# Patient Record
Sex: FEMALE | Race: WHITE | RURAL
[De-identification: names, ages, dates, MRNs, and addresses within clinical notes are randomized per-mention and may not be internally consistent; named-entity substitution may affect disease eponyms.]

---

## 2020-02-24 ENCOUNTER — CONVERSION ENCOUNTER (OUTPATIENT)
Dept: CARDIOLOGY | Facility: CLINIC | Age: 25
End: 2020-02-24
Attending: SPECIALIST

## 2024-12-09 NOTE — PROGRESS NOTES
Chief Complaint/Reason for Referral:  Establish Care (anemia)    Nena Denny, A*  Nena Denny, APRN    Records Obtained:  Records of the patients history including those obtained from [facility] EMR were reviewed and summarized in detail.    Subjective    History of Present Illness    Bro Shepherd 29 y.o. presents to [facility] HEMATOLOGY & ONCOLOGY for Normocytic Anemia    Very pleasant 29-year-old female presents to the hematology department for further evaluation of her normocytic anemia.    Clinical history reveals hepatomegaly and splenomegaly.  I have presented to her PCP with complaints of fever fatigue.  Tmax of 103.6.  Patient was checked for COVID flu which were negative.  Patient states that she was wondering if her spleen could be enlarged since she has a history of having mono in college.    Most recent CBC from 2024 reveals a white blood cell count of 3.9, hemoglobin of 11.7, hematocrit of 35.7, MCV of 85, platelet count 266.  Peripheral blood smear reveals a normocytic normochromic anemia no increase in schistocytes.  Mild leukopenia with otherwise normal granulocyte morphology.  No circulating blasts identified.  Adequate platelets.  Patient is no longer having fevers that lasted 2-3 weeks.    Lyme's titer is negative, [place] spotted fever is negative, EBV titers reveal IgG elevation negative IgM indicative of past history of EBV, CBC from 2024 reveals a normal white blood cell count, normal hemoglobin, normal hematocrit, normal platelet count.  CMP reveals a normal glucose, normal renal function, normal calcium and protein level, normal liver function testing.  Normal thyroid testing, normal vitamin D, normal vitamin B12.    Past surgical history for having tonsillectomy in  and an appendectomy in .     - 9,8,5,1 yo - no complications, vaginal deliveries     Cancer-related family history is not on file.     Oncology/Hematology History  "   No history exists.     Review of Systems   Constitutional:  Positive for fatigue. Negative for diaphoresis, unexpected weight gain and unexpected weight loss.   Gastrointestinal:  Positive for abdominal pain (LUQ). Negative for constipation, diarrhea, nausea and vomiting.   Neurological:  Positive for weakness and headache (some).        Shaky    All other systems reviewed and are negative.    No current outpatient medications on file prior to visit.     No current facility-administered medications on file prior to visit.     No Known Allergies  Past Medical History:   Diagnosis Date    Anemia      History reviewed. No pertinent surgical history.  Social History     Socioeconomic History    Marital status:    Tobacco Use    Smoking status: Never    Smokeless tobacco: Never   Vaping Use    Vaping status: Never Used   Substance and Sexual Activity    Alcohol use: Not Currently    Drug use: Never    Sexual activity: Defer     History reviewed. No pertinent family history.  Immunization History   Administered Date(s) Administered    Fluzone (or Fluarix & Flulaval for VFC) >6mos 10/26/2018, 10/22/2019    Fluzone High-Dose 65+YRS 10/14/2021     Tobacco Use: Low Risk  (12/10/2024)    Patient History     Smoking Tobacco Use: Never     Smokeless Tobacco Use: Never     Passive Exposure: Not on file     Objective     Vitals:    12/10/24 1037   BP: 121/76   Pulse: 92   Resp: 16   Temp: 98.7 °F (37.1 °C)   TempSrc: Temporal   SpO2: 99%   Weight: 84.3 kg (185 lb 12.8 oz)   Height: 172.7 cm (68\")   PainSc: 0-No pain      Physical Exam  Vitals and nursing note reviewed.   Constitutional:       General: She is not in acute distress.     Appearance: Normal appearance. She is not ill-appearing.   HENT:      Head: Normocephalic.   Eyes:      Conjunctiva/sclera: Conjunctivae normal.   Cardiovascular:      Rate and Rhythm: Normal rate and regular rhythm.      Heart sounds: Normal heart sounds.   Pulmonary:      Effort: " Pulmonary effort is normal.      Breath sounds: Normal breath sounds.   Abdominal:      Palpations: Abdomen is soft.   Lymphadenopathy:      Cervical: No cervical adenopathy.      Right cervical: No superficial cervical adenopathy.     Left cervical: No superficial cervical adenopathy.      Upper Body:      Right upper body: No axillary adenopathy.      Left upper body: No axillary adenopathy.   Skin:     Coloration: Skin is not jaundiced.   Neurological:      Mental Status: She is alert.   Psychiatric:         Mood and Affect: Mood normal.         Behavior: Behavior normal. Behavior is cooperative.         Thought Content: Thought content normal.         Judgment: Judgment normal.       Wt Readings from Last 3 Encounters:   12/10/24 84.3 kg (185 lb 12.8 oz)      Body mass index is 28.25 kg/m².    ECOG score: 0         ECOG: (0) Fully Active - Able to Carry On All Pre-disease Performance Without Restriction  Fall Risk Assessment was completed, and patient is at low risk for falls.  PHQ-9 Total Score:       The patient is  experiencing fatigue. Fatigue score: 9    PT/OT Functional Screening:   Speech Functional Screening:   Rehab to be ordered:     Vitals:    12/10/24 1037   PainSc: 0-No pain         Bro Joao reports a pain score of 0.      PHQ-2 Depression Screening  Little interest or pleasure in doing things? Not at all   Feeling down, depressed, or hopeless? Not at all   PHQ-2 Total Score 0     Result Review :  The following data was reviewed by: Loy Root PA-C on 12/10/2024:    LABS SCANNED (11/20/2024)  LABS SCANNED (11/08/2024)  LABS SCANNED (11/05/2024)    No Images in the past 120 days found..     Assessment and Plan:  Diagnoses and all orders for this visit:    1. Normocytic anemia (Primary)  -     H. Pylori Antigen, Stool - Stool, Per Rectum  -     Vitamin B12 & Folate  -     Comprehensive Metabolic Panel  -     Urinalysis With Microscopic - Urine, Clean Catch  -     Occult Blood, Fecal By  Immunoassay - Stool, Per Rectum  -     Reticulocytes  -     Lactate Dehydrogenase  -     Haptoglobin  -     Iron Profile  -     Ferritin  -     CBC & Differential  -     Sedimentation Rate  -     C-reactive Protein    2. Hepatomegaly  -     H. Pylori Antigen, Stool - Stool, Per Rectum  -     Vitamin B12 & Folate  -     Comprehensive Metabolic Panel  -     Urinalysis With Microscopic - Urine, Clean Catch  -     Occult Blood, Fecal By Immunoassay - Stool, Per Rectum  -     Reticulocytes  -     Lactate Dehydrogenase  -     Haptoglobin  -     Iron Profile  -     Ferritin  -     CBC & Differential  -     Sedimentation Rate  -     C-reactive Protein  -     US abdomen limited; Future    3. Splenomegaly  -     H. Pylori Antigen, Stool - Stool, Per Rectum  -     Vitamin B12 & Folate  -     Comprehensive Metabolic Panel  -     Urinalysis With Microscopic - Urine, Clean Catch  -     Occult Blood, Fecal By Immunoassay - Stool, Per Rectum  -     Reticulocytes  -     Lactate Dehydrogenase  -     Haptoglobin  -     Iron Profile  -     Ferritin  -     CBC & Differential  -     Sedimentation Rate  -     C-reactive Protein  -     US abdomen limited; Future      -Encouraged patient to remain up to date on all recommended preventative medicine services specific for their sex and age.  Some examples include:  Diabetes screening, Hypertensive screening, Immunizations, Lipid screenings (cholesterol), Depression screenings, Colorectal cancer screening, HIV screening, Cervical cancer screening, Breast cancer screening, Osteoporosis screening, Alcohol screening, Dental screening, Tobacco Use screening and Dietary screening    Patient Follow Up: 2 months with MD JOLLY spent 45 minutes caring for Bro on this date of service. This time includes time spent by me in the following activities:preparing for the visit, reviewing tests, obtaining and/or reviewing a separately obtained history, performing a medically appropriate examination and/or  evaluation , counseling and educating the patient/family/caregiver, ordering medications, tests, or procedures, documenting information in the medical record, and independently interpreting results and communicating that information with the patient/family/caregiver    Patient was given instructions and counseling regarding her condition or for health maintenance advice. Please see specific information pulled into the AVS if appropriate.

## 2024-12-10 ENCOUNTER — CONSULT (OUTPATIENT)
Dept: ONCOLOGY | Facility: HOSPITAL | Age: 29
End: 2024-12-10
Payer: COMMERCIAL

## 2024-12-10 ENCOUNTER — LAB (OUTPATIENT)
Dept: ONCOLOGY | Facility: HOSPITAL | Age: 29
End: 2024-12-10
Payer: COMMERCIAL

## 2024-12-10 VITALS
HEART RATE: 92 BPM | TEMPERATURE: 98.7 F | WEIGHT: 185.8 LBS | DIASTOLIC BLOOD PRESSURE: 76 MMHG | RESPIRATION RATE: 16 BRPM | BODY MASS INDEX: 28.16 KG/M2 | OXYGEN SATURATION: 99 % | HEIGHT: 68 IN | SYSTOLIC BLOOD PRESSURE: 121 MMHG

## 2024-12-10 DIAGNOSIS — D64.9 NORMOCYTIC ANEMIA: Primary | ICD-10-CM

## 2024-12-10 DIAGNOSIS — R16.0 HEPATOMEGALY: ICD-10-CM

## 2024-12-10 DIAGNOSIS — R16.1 SPLENOMEGALY: ICD-10-CM

## 2024-12-10 PROCEDURE — G0463 HOSPITAL OUTPT CLINIC VISIT: HCPCS | Performed by: PHYSICIAN ASSISTANT

## 2024-12-12 ENCOUNTER — TELEPHONE (OUTPATIENT)
Dept: ONCOLOGY | Facility: HOSPITAL | Age: 29
End: 2024-12-12

## 2024-12-12 NOTE — TELEPHONE ENCOUNTER
Caller: Bro Shepherd    Relationship: Self    Best call back number:     [phone]     Who are you requesting to speak with (clinical staff, provider,  specific staff member): CLINICAL    What was the call regarding: PT WANT TO LET US KNOW THAT SHE HAD HER LABS DONE WITH HER PCP YESTERDAY AND THAT WE SHOULD GET THOSE SENT TO US AS SOON AS THEY GET THEM BACK.    NO NEED FOR A CALL BACK.

## 2024-12-16 ENCOUNTER — TELEPHONE (OUTPATIENT)
Dept: ONCOLOGY | Facility: HOSPITAL | Age: 29
End: 2024-12-16

## 2024-12-16 DIAGNOSIS — R77.8 LOW SERUM HAPTOGLOBIN: Primary | ICD-10-CM

## 2024-12-16 DIAGNOSIS — R74.02 ELEVATED LDH: ICD-10-CM

## 2024-12-16 NOTE — TELEPHONE ENCOUNTER
Caller: Bro Shepherd    Relationship: Self    Best call back number: [phone]     What is the best time to reach you: ANYTIME    Who are you requesting to speak with (clinical staff, provider,  specific staff member): CLINICAL        What was the call regarding: PT SAYS PCP IS FAXING LAB RESULTS TO OUR OFFICE, PLEASE CALL PT TO ADVISE WHEN THESE ARE RECEIVED.

## 2024-12-16 NOTE — TELEPHONE ENCOUNTER
The pt called Triage and left a vm. This nurse returned the pt's call. The pt c/o light headedness, dizziness, and weakness. The pt states that it started this weekend and is still persisting. When questioned the pt states that changing body position from setting/standing to lying down does not help. The pt also states that eating does not help either. When questioned the pt denies any fever. The pt states that she has to hold on to things or the wall to ambulate. The pt is requesting to speak to Brigido EWING.    The pt's labs were faxed over from the PCP and are under the media tab.

## 2024-12-27 ENCOUNTER — HOSPITAL ENCOUNTER (OUTPATIENT)
Dept: ULTRASOUND IMAGING | Facility: HOSPITAL | Age: 29
Discharge: HOME | End: 2024-12-27
Payer: COMMERCIAL

## 2024-12-27 ENCOUNTER — LAB (OUTPATIENT)
Dept: LAB | Facility: HOSPITAL | Age: 29
End: 2024-12-27
Payer: COMMERCIAL

## 2024-12-27 DIAGNOSIS — R16.1 SPLENOMEGALY: ICD-10-CM

## 2024-12-27 DIAGNOSIS — R16.0 HEPATOMEGALY: ICD-10-CM

## 2024-12-27 LAB
BACTERIA UR QL AUTO: NORMAL /HPF
BILIRUB UR QL STRIP: NEGATIVE
CLARITY UR: CLEAR
COLOR UR: YELLOW
GLUCOSE UR STRIP-MCNC: NEGATIVE MG/DL
H. PYLORI ANTIGEN STOOL: NEGATIVE
HEMOCCULT STL QL IA: NEGATIVE
HGB UR QL STRIP.AUTO: NEGATIVE
HYALINE CASTS UR QL AUTO: NORMAL /LPF
KETONES UR QL STRIP: NEGATIVE
LEUKOCYTE ESTERASE UR QL STRIP.AUTO: NEGATIVE
NITRITE UR QL STRIP: NEGATIVE
PH UR STRIP.AUTO: 6.5 [PH] (ref 5–8)
PROT UR QL STRIP: NEGATIVE
RBC # UR STRIP: NORMAL /HPF
REF LAB TEST METHOD: NORMAL
SP GR UR STRIP: 1.02 (ref 1–1.03)
SQUAMOUS #/AREA URNS HPF: NORMAL /HPF
UROBILINOGEN UR QL STRIP: NORMAL
WBC # UR STRIP: NORMAL /HPF

## 2024-12-27 PROCEDURE — 81001 URINALYSIS AUTO W/SCOPE: CPT | Performed by: PHYSICIAN ASSISTANT

## 2024-12-27 PROCEDURE — 76700 US EXAM ABDOM COMPLETE: CPT

## 2024-12-27 PROCEDURE — 82274 ASSAY TEST FOR BLOOD FECAL: CPT | Performed by: PHYSICIAN ASSISTANT

## 2024-12-27 PROCEDURE — 87338 HPYLORI STOOL AG IA: CPT | Performed by: PHYSICIAN ASSISTANT

## 2025-01-24 ENCOUNTER — LAB (OUTPATIENT)
Dept: ONCOLOGY | Facility: HOSPITAL | Age: 30
End: 2025-01-24
Payer: COMMERCIAL

## 2025-01-24 ENCOUNTER — OFFICE VISIT (OUTPATIENT)
Dept: ONCOLOGY | Facility: HOSPITAL | Age: 30
End: 2025-01-24
Payer: COMMERCIAL

## 2025-01-24 VITALS
HEART RATE: 84 BPM | BODY MASS INDEX: 28.4 KG/M2 | HEIGHT: 68 IN | DIASTOLIC BLOOD PRESSURE: 61 MMHG | WEIGHT: 187.39 LBS | SYSTOLIC BLOOD PRESSURE: 120 MMHG | OXYGEN SATURATION: 99 % | TEMPERATURE: 98.3 F | RESPIRATION RATE: 18 BRPM

## 2025-01-24 DIAGNOSIS — D64.9 NORMOCYTIC ANEMIA: Primary | ICD-10-CM

## 2025-01-24 PROCEDURE — 99214 OFFICE O/P EST MOD 30 MIN: CPT | Performed by: INTERNAL MEDICINE

## 2025-01-24 NOTE — PROGRESS NOTES
Chief Complaint/Reason for Referral:  FOLLOW UP 2    Denisha, Nena Stephense, A*  Nena Denny Mikki, APRN    Records Obtained:  Records of the patients history including those obtained from [facility] EMR were reviewed and summarized in detail.    Subjective    History of Present Illness    Bro Shepherd 29 y.o. presents to [facility] HEMATOLOGY & ONCOLOGY for Normocytic Anemia    Very pleasant 29-year-old female presents to the hematology department for further evaluation of her normocytic anemia.  Patient reports 2-week span of fever early November.  She has recovered from this.  She is at her baseline health.  Denies any bleeding.  Does have menstrual cycles that have been prolonged since the febrile illness.  She was found to have enlarged spleen during a febrile illness.  Repeat abdomen ultrasound on 27 December showed normal liver and normal spleen at 12.8 cm but was on the upper limit of normal.  No acute findings.  Labs show signs of hemolysis with elevated LDH and low haptoglobin.  Jeremy negative.  CBC normal.  Patient eating better.  Denies early satiety.  No pain reported.      Hematology History  Patient with 2 week febrile illness early November. Found to have enlarged spleen on imaging.   November 2024 reveals a white blood cell count of 3.9, hemoglobin of 11.7, hematocrit of 35.7, MCV of 85, platelet count 266.  Peripheral blood smear reveals a normocytic normochromic anemia no increase in schistocytes.  Mild leukopenia with otherwise normal granulocyte morphology.  No circulating blasts identified.  Adequate platelets.  Patient is no longer having fevers that lasted 2-3 weeks.    Lyme's titer is negative, [place] spotted fever is negative, EBV titers reveal IgG elevation negative IgM indicative of past history of EBV, CBC from November 2024 reveals a normal white blood cell count, normal hemoglobin, normal hematocrit, normal platelet count.  CMP reveals a normal glucose, normal renal  function, normal calcium and protein level, normal liver function testing.  Normal thyroid testing, normal vitamin D, normal vitamin B12.    12/16/24: Hapto 13 (low), , CRP 3 (wnl), B12 681, folate 10.3, retic count 1.6% (wnl), WBC 3.9, hgb 11.9, MCV 83, plt 213, H pylori stool antigen negative, OCTAVIA negative, Jeremy negative.     12/27/24: US abdomen: Liver normal, spllen 12.8 cm, upper limit of normal. No acute findings.      Cancer-related family history is not on file.     Oncology/Hematology History    No history exists.     Review of Systems   Constitutional:  Negative for diaphoresis, unexpected weight gain and unexpected weight loss.   Gastrointestinal:  Negative for constipation, diarrhea, nausea and vomiting. Abdominal pain: LUQ.  Neurological:  Headache: some.        Shaky    All other systems reviewed and are negative.    No current outpatient medications on file prior to visit.     No current facility-administered medications on file prior to visit.     No Known Allergies  Past Medical History:   Diagnosis Date    Anemia      History reviewed. No pertinent surgical history.  Social History     Socioeconomic History    Marital status:    Tobacco Use    Smoking status: Never    Smokeless tobacco: Never   Vaping Use    Vaping status: Never Used   Substance and Sexual Activity    Alcohol use: Not Currently    Drug use: Never    Sexual activity: Defer     History reviewed. No pertinent family history.  Immunization History   Administered Date(s) Administered    Fluzone (or Fluarix & Flulaval for VFC) >6mos 10/26/2018, 10/22/2019    Fluzone High-Dose 65+YRS 10/14/2021     Tobacco Use: Low Risk  (1/24/2025)    Patient History     Smoking Tobacco Use: Never     Smokeless Tobacco Use: Never     Passive Exposure: Not on file     Objective     Vitals:    01/24/25 1518   BP: 120/61   Pulse: 84   Resp: 18   Temp: 98.3 °F (36.8 °C)   TempSrc: Temporal   SpO2: 99%   Weight: 85 kg (187 lb 6.3 oz)   Height:  "172.7 cm (67.99\")   PainSc: 0-No pain        Physical Exam  Well appearing patient in no acute distress on RA  Anicteric sclerae, no rash on exposed skin  Respirations non-labored  Awake, alert, and oriented x 4. Speech intact. No gross neurologic deficit  Appropriate mood and affect    Wt Readings from Last 3 Encounters:   12/10/24 84.3 kg (185 lb 12.8 oz)      Body mass index is 28.5 kg/m².              ECOG: (0) Fully Active - Able to Carry On All Pre-disease Performance Without Restriction  Fall Risk Assessment was completed, and patient is at low risk for falls.  PHQ-9 Total Score:       The patient is  experiencing fatigue. Fatigue score: 9    PT/OT Functional Screening:   Speech Functional Screening:   Rehab to be ordered:         Result Review :  The following data was reviewed by: Darius Tuttle MD on 01/24/25:    LABS SCANNED (11/20/2024)  LABS SCANNED (11/08/2024)  LABS SCANNED (11/05/2024)      Labs personally reviewed    LDH elevated. Hapto low. Jeremy negative     Assessment and Plan:  Diagnoses and all orders for this visit:    1. Normocytic anemia (Primary)  -     Peripheral Blood Smear; Future  -     CBC & Differential; Future  -     Haptoglobin; Future  -     Lactate Dehydrogenase; Future  -     Ferritin; Future  -     Iron Profile; Future  -     CBC & Differential; Future        Hemolysis  Elevated LDH and low haptoglobin consistent with hemolysis.  Jeremy test negative.  OCTAVIA negative. Retic count normal. This is non-immune related hemolysis.  Fortunately patient is not anemic.  Strong suspicion that patient had infectious related hemolysis especially as she had a large spleen.  Fortunately the spleen is now normal size per recent abdominal ultrasound.  Patient is also recovered from illness.  Other blood counts normal.  Will get peripheral smear to evaluate for hereditary RBC membrane disorders (prior smear without schistocytes) with repeat CBC today.  Unlikely patient has enzyme " deficiency causing hemolysis as she does not have a chronic history of this nor family history.  No new medications.  Recommend repeat LDH, haptoglobin, CBC in 2 months.  Will get iron studies at that time as well.  Follow-up at that time.        The patient was seen and examined. Work by the provider also included review and/or ordering of lab tests, review and/or ordering of radiology tests, review and/or ordering of medicine tests, discussion with other physicians or providers, independent review of data, obtaining old records, review/summation of old records, and/or other review.     I have reviewed the family history, social history, and past medical history for this patient. Previous information and data has been reviewed and updated as needed. I have reviewed and verified the chief complaint, history, and other documentation. The patient was interviewed and examined at the bedside and the chart reviewed. The previous observations, recommendations, and conclusions were reviewed including those of other providers.     The plan was discussed with the patient and/or family. The patient was given time to ask questions and these questions were answered. At the conclusion of their visit they had no additional questions or concerns and all questions were answered to their satisfaction.      Patient Follow Up: 2 months     I spent 30 minutes caring for Bro on this date of service. This time includes time spent by me in the following activities:preparing for the visit, reviewing tests, obtaining and/or reviewing a separately obtained history, performing a medically appropriate examination and/or evaluation , counseling and educating the patient/family/caregiver, ordering medications, tests, or procedures, documenting information in the medical record, and independently interpreting results and communicating that information with the patient/family/caregiver    Patient was given instructions and counseling regarding  her condition or for health maintenance advice. Please see specific information pulled into the AVS if appropriate.